# Patient Record
Sex: FEMALE | Race: BLACK OR AFRICAN AMERICAN | Employment: UNEMPLOYED | URBAN - METROPOLITAN AREA
[De-identification: names, ages, dates, MRNs, and addresses within clinical notes are randomized per-mention and may not be internally consistent; named-entity substitution may affect disease eponyms.]

---

## 2019-01-25 ENCOUNTER — HOSPITAL ENCOUNTER (EMERGENCY)
Age: 19
Discharge: HOME | End: 2019-01-25
Attending: EMERGENCY MEDICINE
Payer: MEDICAID

## 2019-01-25 VITALS
DIASTOLIC BLOOD PRESSURE: 85 MMHG | HEART RATE: 108 BPM | TEMPERATURE: 98.7 F | HEIGHT: 57 IN | BODY MASS INDEX: 20.49 KG/M2 | WEIGHT: 95 LBS | OXYGEN SATURATION: 100 % | RESPIRATION RATE: 16 BRPM | SYSTOLIC BLOOD PRESSURE: 137 MMHG

## 2019-01-25 DIAGNOSIS — J06.9 VIRAL URI WITH COUGH: Primary | ICD-10-CM

## 2019-01-25 PROCEDURE — 99282 EMERGENCY DEPT VISIT SF MDM: CPT

## 2019-01-25 RX ORDER — BENZONATATE 200 MG/1
200 CAPSULE ORAL
Qty: 20 CAP | Refills: 0 | Status: SHIPPED | OUTPATIENT
Start: 2019-01-25 | End: 2019-02-01

## 2019-01-25 NOTE — DISCHARGE INSTRUCTIONS
1) Plenty of fluids  2) Tessalon for cough  3) Cool mist vaporizer in bedroom at night  4) Honey 1 tsp as needed for cough  5) Vapo Rub to chest wall at night  6) Mentholated cough drops as needed  7) Recheck with PCP 1 week if not improved. Patient Education        Upper Respiratory Infection (Cold): Care Instructions  Your Care Instructions    An upper respiratory infection, or URI, is an infection of the nose, sinuses, or throat. URIs are spread by coughs, sneezes, and direct contact. The common cold is the most frequent kind of URI. The flu and sinus infections are other kinds of URIs. Almost all URIs are caused by viruses. Antibiotics won't cure them. But you can treat most infections with home care. This may include drinking lots of fluids and taking over-the-counter pain medicine. You will probably feel better in 4 to 10 days. The doctor has checked you carefully, but problems can develop later. If you notice any problems or new symptoms, get medical treatment right away. Follow-up care is a key part of your treatment and safety. Be sure to make and go to all appointments, and call your doctor if you are having problems. It's also a good idea to know your test results and keep a list of the medicines you take. How can you care for yourself at home? · To prevent dehydration, drink plenty of fluids, enough so that your urine is light yellow or clear like water. Choose water and other caffeine-free clear liquids until you feel better. If you have kidney, heart, or liver disease and have to limit fluids, talk with your doctor before you increase the amount of fluids you drink. · Take an over-the-counter pain medicine, such as acetaminophen (Tylenol), ibuprofen (Advil, Motrin), or naproxen (Aleve). Read and follow all instructions on the label. · Before you use cough and cold medicines, check the label. These medicines may not be safe for young children or for people with certain health problems.   · Be careful when taking over-the-counter cold or flu medicines and Tylenol at the same time. Many of these medicines have acetaminophen, which is Tylenol. Read the labels to make sure that you are not taking more than the recommended dose. Too much acetaminophen (Tylenol) can be harmful. · Get plenty of rest.  · Do not smoke or allow others to smoke around you. If you need help quitting, talk to your doctor about stop-smoking programs and medicines. These can increase your chances of quitting for good. When should you call for help? Call 911 anytime you think you may need emergency care. For example, call if:    · You have severe trouble breathing.    Call your doctor now or seek immediate medical care if:    · You seem to be getting much sicker.     · You have new or worse trouble breathing.     · You have a new or higher fever.     · You have a new rash.    Watch closely for changes in your health, and be sure to contact your doctor if:    · You have a new symptom, such as a sore throat, an earache, or sinus pain.     · You cough more deeply or more often, especially if you notice more mucus or a change in the color of your mucus.     · You do not get better as expected. Where can you learn more? Go to http://grace-jackelyn.info/. Enter F808 in the search box to learn more about \"Upper Respiratory Infection (Cold): Care Instructions. \"  Current as of: September 5, 2018  Content Version: 11.9  © 4942-1194 [employer]. Care instructions adapted under license by [employer] (which disclaims liability or warranty for this information). If you have questions about a medical condition or this instruction, always ask your healthcare professional. [place] [address] any warranty or liability for your use of this information.

## 2019-01-25 NOTE — ED NOTES
Jacqui Prater is a 19 y.o. female that was discharged in stable condition. The patients diagnosis, condition and treatment were explained to  patient and aftercare instructions were given. The patient verbalized understanding. Patient armband removed and shredded.

## 2019-01-25 NOTE — LETTER
Work Note January 25, 2019: To Whom It May concern: 
 
Minal Welsh was seen and evaluated today in the emergency room for an acute illness. Minal Welsh may return to work on 1/26/19. Duty restrictions as follows: may be contagious to others through aerosol contact (coughing, sneezing) next 3-5 days. Sincerely, Kai Vazquez MD

## 2019-01-25 NOTE — ED PROVIDER NOTES
EMERGENCY DEPARTMENT HISTORY AND PHYSICAL EXAM 
 
7:21 AM 
 
 
Date: 1/25/2019 Patient Name: Chema Crowe History of Presenting Illness Chief Complaint Patient presents with  Cough  Vomiting History Provided By: Patient Chief Complaint: Cough Duration: 2 Weeks Timing:  Constant Location: N/A Quality: Symptomatic Severity: Moderate Modifying Factors: Nothing makes it better or worse Associated Symptoms: nausea, vomiting, congestion, sore throat, coughing up phlegm Additional History (Context): Chema Crowe is a 19 y.o. female with No significant past medical history who presents with a constant, symptomatic and moderate cough that started about 2 weeks ago. Patient said that she has had a \"hacking cough and bring phlegm up but then end up swallowing it. \" She denies taking any over the counter drugs and has been letting it run its course. Nothing makes it better or worse and associated symptoms are nausea, vomiting, congestion and sore throat. Patient siad that she thinks her nausea and vomiting is from her coughing and the last time she vomited was last night. No other concerns or symptoms at this time. No documented fever. Has runny nose and nasal congestion. PCP: None Past History Past Medical History: 
History reviewed. No pertinent past medical history. Past Surgical History: 
History reviewed. No pertinent surgical history. Family History: 
History reviewed. No pertinent family history. Social History: 
Social History Tobacco Use  Smoking status: Never Smoker  Smokeless tobacco: Never Used Substance Use Topics  Alcohol use: Not on file  Drug use: Not on file Allergies: 
No Known Allergies Review of Systems Review of Systems Constitutional: Positive for fever (subjective). HENT: Positive for congestion, rhinorrhea and sneezing. Respiratory: Positive for cough. Negative for shortness of breath. All other systems reviewed and are negative. Physical Exam  
 
Visit Vitals /85 (BP Patient Position: At rest) Pulse (!) 108 Temp 98.7 °F (37.1 °C) Resp 16 Ht 4' 9\" (1.448 m) Wt 43.1 kg (95 lb) LMP 01/15/2019 SpO2 100% BMI 20.56 kg/m² Physical Exam  
Constitutional: She is oriented to person, place, and time. She appears well-developed and well-nourished. No distress. HENT:  
Head: Normocephalic and atraumatic. TMs normal, OP normal, nose congested Eyes: No scleral icterus. Cardiovascular: Normal rate. Pulmonary/Chest: Effort normal and breath sounds normal. No respiratory distress. She has no wheezes. She has no rales. Lymphadenopathy:  
  She has no cervical adenopathy. Neurological: She is alert and oriented to person, place, and time. Skin: Skin is warm and dry. Psychiatric: She has a normal mood and affect. Nursing note and vitals reviewed. Diagnostic Study Results Labs - No results found for this or any previous visit (from the past 12 hour(s)). Radiologic Studies - No orders to display Medical Decision Making It should be noted that Vicki Ward MD will be the provider of record for this patient. I reviewed the vital signs, available nursing notes, past medical history, past surgical history, family history and social history. Vital Signs-Reviewed the patient's vital signs. Pulse Oximetry Analysis -  100% on room air wnl Records Reviewed: Nursing Notes and Old Medical Records (Time of Review: 7:21 AM) ED Course: Progress Notes, Reevaluation, and Consults: 
 
Imp: Viral URI w/ cough. Clear lungs, afeb, normal O2 sat. Will withhold antibiotics per CDC guidelines: \"Routine treatment of uncomplicated acute bronchitis with antibiotics is not recommended, regardless of cough duration. \" Symptomatic treatment. Diagnosis Clinical Impression: 1. Viral URI with cough 1) Plenty of fluids 2) Tessalon for cough 3) Cool mist vaporizer in bedroom at night 4) Honey 1 tsp as needed for cough 5) Vapo Rub to chest wall at night 6) Mentholated cough drops as needed 7) Recheck with PCP 1 week if not improved. Disposition: home Follow-up Information Follow up With Specialties Details Why Contact Info [address]  In 1 week As needed, If symptoms persist [address] 
[phone] [address] [facility] EMERGENCY DEPT Emergency Medicine  As needed, If symptoms worsen [address] [id number] [phone] Medication List  
  
START taking these medications   
benzonatate 200 mg capsule Commonly known as:  TESSALON Take 1 Cap by mouth three (3) times daily as needed for Cough for up to 7 days. Where to Get Your Medications Information about where to get these medications is not yet available Ask your nurse or doctor about these medications · benzonatate 200 mg capsule 
  
 
_______________________________ Scribe Attestation Lenis Nissen acting as a scribe for and in the presence of Vidhi Mondragon MD     
January 25, 2019 at 7:21 AM 
    
Provider Attestation:     
I personally performed the services described in the documentation, reviewed the documentation, as recorded by the scribe in my presence, and it accurately and completely records my words and actions. January 25, 2019 at 7:21 AM - Vidhi Mondragon MD   
 
 
_______________________________